# Patient Record
Sex: FEMALE | Race: WHITE | URBAN - METROPOLITAN AREA
[De-identification: names, ages, dates, MRNs, and addresses within clinical notes are randomized per-mention and may not be internally consistent; named-entity substitution may affect disease eponyms.]

---

## 2017-01-28 ENCOUNTER — TELEPHONE (OUTPATIENT)
Dept: FAMILY MEDICINE CLINIC | Facility: CLINIC | Age: 57
End: 2017-01-28

## 2017-01-28 RX ORDER — FLUCONAZOLE 200 MG/1
TABLET ORAL
COMMUNITY
Start: 2016-10-22 | End: 2017-06-17 | Stop reason: ALTCHOICE

## 2017-01-28 RX ORDER — INHALER, ASSIST DEVICES
SPACER (EA) MISCELLANEOUS
COMMUNITY
Start: 2008-11-05 | End: 2020-12-02

## 2017-01-28 RX ORDER — ALBUTEROL SULFATE 2.5 MG/3ML
SOLUTION RESPIRATORY (INHALATION) 2 TIMES DAILY PRN
COMMUNITY
Start: 2008-11-05

## 2017-01-28 RX ORDER — MONTELUKAST SODIUM 10 MG/1
10 TABLET ORAL NIGHTLY
COMMUNITY
Start: 2009-06-08

## 2017-01-28 RX ORDER — ALBUTEROL SULFATE 90 UG/1
2 AEROSOL, METERED RESPIRATORY (INHALATION) DAILY PRN
COMMUNITY
Start: 2009-03-16

## 2017-01-28 NOTE — TELEPHONE ENCOUNTER
As above. Patient with Sinus Congestion/Pain. Allergist feels that at some point will need MRI regarding Chronic Sinus Sx. Today Patient with Sinus Sx and requesting Antibiotic. Patient informed there are no openings today at [facility].   Advise evalu

## 2017-03-06 ENCOUNTER — OFFICE VISIT (OUTPATIENT)
Dept: FAMILY MEDICINE CLINIC | Facility: CLINIC | Age: 57
End: 2017-03-06

## 2017-03-06 VITALS
HEART RATE: 101 BPM | RESPIRATION RATE: 20 BRPM | OXYGEN SATURATION: 97 % | SYSTOLIC BLOOD PRESSURE: 116 MMHG | WEIGHT: 193.63 LBS | TEMPERATURE: 99 F | DIASTOLIC BLOOD PRESSURE: 80 MMHG

## 2017-03-06 DIAGNOSIS — J45.41 MODERATE PERSISTENT ASTHMA WITH ACUTE EXACERBATION: ICD-10-CM

## 2017-03-06 DIAGNOSIS — J20.9 BRONCHITIS, ACUTE, WITH BRONCHOSPASM: Primary | ICD-10-CM

## 2017-03-06 PROCEDURE — 99214 OFFICE O/P EST MOD 30 MIN: CPT | Performed by: FAMILY MEDICINE

## 2017-03-06 RX ORDER — AZITHROMYCIN 250 MG/1
TABLET, FILM COATED ORAL
Qty: 6 TABLET | Refills: 0 | Status: SHIPPED | OUTPATIENT
Start: 2017-03-06 | End: 2017-06-17 | Stop reason: ALTCHOICE

## 2017-03-06 RX ORDER — PREDNISONE 20 MG/1
20 TABLET ORAL 2 TIMES DAILY
Qty: 10 TABLET | Refills: 0 | Status: SHIPPED | OUTPATIENT
Start: 2017-03-06 | End: 2017-03-11

## 2017-03-07 NOTE — PROGRESS NOTES
[facility] [place]  PROGRESS NOTE  Chief Complaint:   Patient presents with:  Cough    HPI:   This is a 56year old female presents complaining of cough for past 2 weeks. Patient is recovering from flu about 2 weeks ago.   Since then she has bee Aerosol Powder, Breath Activated  Disp:  Rfl:    Montelukast Sodium (SINGULAIR) 10 MG Oral Tab Take 10 mg by mouth nightly. Disp:  Rfl:    Progesterone Micronized 100 MG Oral Cap Take 100 mg by mouth daily.    Disp:  Rfl:    Spacer/Aero-Holding Chambers ( No cervical lymphadenopathy, no other lymphadenopathy. SKIN: No rashes, no skin lesion, no bruising, good turgor. LUNGS: No rhonchi or rales present. Frequent congested cough, bilateral wheezing present.   HEART:  Regular rate and rhythm, no murmurs, rub verbalizes understanding. Patient is notified to call with any questions, complications, allergies, or worsening or changing symptoms. Patient is to call with any side effects or complications from the treatments as a result of today.      Problem List:  P

## 2017-03-07 NOTE — PATIENT INSTRUCTIONS
Advice rest, plenty of fluids. Continue with inhalers and nebulizer treatment. Start antibiotics and prednisone. Continue with mucinex as needed   Return to clinic in 1-2 weeks if no improvement. Sooner if symptoms gets worse.

## 2017-06-17 ENCOUNTER — OFFICE VISIT (OUTPATIENT)
Dept: FAMILY MEDICINE CLINIC | Facility: CLINIC | Age: 57
End: 2017-06-17

## 2017-06-17 VITALS
HEART RATE: 84 BPM | DIASTOLIC BLOOD PRESSURE: 70 MMHG | BODY MASS INDEX: 33.63 KG/M2 | RESPIRATION RATE: 14 BRPM | SYSTOLIC BLOOD PRESSURE: 108 MMHG | WEIGHT: 197 LBS | HEIGHT: 64 IN | TEMPERATURE: 99 F

## 2017-06-17 DIAGNOSIS — N89.8 VAGINAL DISCHARGE: ICD-10-CM

## 2017-06-17 DIAGNOSIS — B37.3 VAGINAL CANDIDIASIS: Primary | ICD-10-CM

## 2017-06-17 DIAGNOSIS — N89.8 VAGINAL ITCHING: ICD-10-CM

## 2017-06-17 PROCEDURE — 87510 GARDNER VAG DNA DIR PROBE: CPT | Performed by: NURSE PRACTITIONER

## 2017-06-17 PROCEDURE — 99214 OFFICE O/P EST MOD 30 MIN: CPT | Performed by: NURSE PRACTITIONER

## 2017-06-17 PROCEDURE — 87660 TRICHOMONAS VAGIN DIR PROBE: CPT | Performed by: NURSE PRACTITIONER

## 2017-06-17 PROCEDURE — 87480 CANDIDA DNA DIR PROBE: CPT | Performed by: NURSE PRACTITIONER

## 2017-06-17 RX ORDER — FLUCONAZOLE 150 MG/1
150 TABLET ORAL ONCE
Qty: 1 TABLET | Refills: 0 | Status: SHIPPED | OUTPATIENT
Start: 2017-06-17 | End: 2017-06-17

## 2017-06-17 RX ORDER — METFORMIN HYDROCHLORIDE 500 MG/1
2 TABLET, EXTENDED RELEASE ORAL DAILY
Refills: 5 | COMMUNITY
Start: 2017-06-04

## 2017-06-17 RX ORDER — BUPROPION HYDROCHLORIDE 150 MG/1
150 TABLET ORAL
Refills: 5 | COMMUNITY
Start: 2017-06-08

## 2017-06-17 NOTE — PATIENT INSTRUCTIONS
Take diflucan as prescribed. No alcohol with this medication x 24 hours. Will call with vaginosis swab results next week. Stay hydrated. Keep scheduled appt with Dr. Millard Primrose.

## 2017-06-17 NOTE — PROGRESS NOTES
[address] NOTE  Cristiano Kelly is a 56year old female.     Chief Complaint:  Patient presents with:  Vaginal Problem: vaginal itching/ rashX1 month has been treating with monistat    HPI:   Patient presents to office visit with albuterol sulfate (2.5 MG/3ML) 0.083% Inhalation Nebu Soln  Disp:  Rfl:    Budesonide (RHINOCORT AQUA) 32 MCG/ACT Nasal Suspension  Disp:  Rfl:    Mometasone Furoate (ASMANEX 120 METERED DOSES) 220 MCG/INH Inhalation Aerosol Powder, Breath Activated  Dis Patient Instructions    Meds & Refills for this Visit:    Signed Prescriptions Disp Refills    fluconazole (DIFLUCAN) 150 MG Oral Tab 1 tablet 0      Sig: Take 1 tablet (150 mg total) by mouth once.            Risks, benefits, side effects of medication add

## 2017-06-19 ENCOUNTER — TELEPHONE (OUTPATIENT)
Dept: FAMILY MEDICINE CLINIC | Facility: CLINIC | Age: 57
End: 2017-06-19

## 2017-06-19 RX ORDER — FLUCONAZOLE 150 MG/1
150 TABLET ORAL ONCE
Qty: 1 TABLET | Refills: 0 | Status: SHIPPED | OUTPATIENT
Start: 2017-06-19 | End: 2017-06-19

## 2017-06-19 NOTE — TELEPHONE ENCOUNTER
Patient notified of results states she is still having mild symptoms is wanting to know if she possibly do a longer treatment. Please advise?

## 2017-06-19 NOTE — TELEPHONE ENCOUNTER
----- Message from AMAN Haines sent at 6/19/2017  8:39 AM CDT -----  Negative for yeast, BV, trichomonas. Patient is using Monistat to treat yeast infection at home. Patient was given Diflucan in office visit, please see how patient is doing.

## 2017-06-19 NOTE — TELEPHONE ENCOUNTER
Will give one more dose of diflucan to take tomorrow, order placed. If still having symptoms in 1 week, return for further evaluation. Pt seeing CPX next month.

## 2017-06-19 NOTE — TELEPHONE ENCOUNTER
Patient notified of Clent Roosevelt recommendations notified Diflucan sent patient should take tomorrow and if still having symptoms in 1 week to return to clinic for further evaluation. Patient expressed understanding and thanks.

## 2017-07-29 ENCOUNTER — OFFICE VISIT (OUTPATIENT)
Dept: FAMILY MEDICINE CLINIC | Facility: CLINIC | Age: 57
End: 2017-07-29

## 2017-07-29 VITALS
RESPIRATION RATE: 16 BRPM | SYSTOLIC BLOOD PRESSURE: 118 MMHG | HEIGHT: 64 IN | DIASTOLIC BLOOD PRESSURE: 78 MMHG | HEART RATE: 88 BPM | TEMPERATURE: 98 F | BODY MASS INDEX: 33.32 KG/M2 | WEIGHT: 195.19 LBS | OXYGEN SATURATION: 97 %

## 2017-07-29 DIAGNOSIS — N95.9 MENOPAUSAL AND POSTMENOPAUSAL DISORDER: ICD-10-CM

## 2017-07-29 DIAGNOSIS — Z00.00 ROUTINE GENERAL MEDICAL EXAMINATION AT A HEALTH CARE FACILITY: Primary | ICD-10-CM

## 2017-07-29 DIAGNOSIS — G47.33 OSA (OBSTRUCTIVE SLEEP APNEA): ICD-10-CM

## 2017-07-29 DIAGNOSIS — E66.3 OVERWEIGHT: ICD-10-CM

## 2017-07-29 PROCEDURE — 99396 PREV VISIT EST AGE 40-64: CPT | Performed by: FAMILY MEDICINE

## 2017-07-29 NOTE — PATIENT INSTRUCTIONS
PT TO PROVIDE COPY OF RECORDS OF LABS    PT TO RETURN TO [facility]ONE REPLACEMENT    MAMMOGRAM- - Jessica OREILLY    SEE WORK FORM-  WORKER

## 2017-07-29 NOTE — PROGRESS NOTES
CC: Annual Physical Exam    HPI:   Lu León is a 56year old female who presents for a complete physical exam.    Pt generally feeling well.   COLONOSCOPY DONE 2016  MIRANDA-= 9971  Dr. Dayana Neri- allergist/ endocrine  Pt with postmenopausal dryness, irrita SHARI (obstructive sleep apnea)    • Periodic limb movement disorder (PLMD)    • Premenstrual dysphoric syndrome       Past Surgical History:  No date:       Comment: 2 times  2016: COLONOSCOPY  2009: TOTAL ABDOMINAL HYSTERECTOMY      Comment: OVARI polydipsia. ALLERGIES:  Denies allergic response, history of asthma, sneezing, hives, eczema or rhinitis.     EXAM:   /78 (BP Location: Left arm, Patient Position: Sitting, Cuff Size: large)   Pulse 88   Temp 98.2 °F (36.8 °C) (Temporal)   Resp 16 exam.  Pap and pelvic done. Self breast exam explained. Health maintenance, will check : No orders of the defined types were placed in this encounter.       Patient Instructions   PT TO PROVIDE COPY OF RECORDS OF LABS    PT TO RETURN TO [facility]

## 2017-08-01 PROBLEM — G47.61 PERIODIC LIMB MOVEMENT DISORDER (PLMD): Status: ACTIVE | Noted: 2017-03-08

## 2017-11-08 ENCOUNTER — OFFICE VISIT (OUTPATIENT)
Dept: FAMILY MEDICINE CLINIC | Facility: CLINIC | Age: 57
End: 2017-11-08

## 2017-11-08 VITALS
HEART RATE: 96 BPM | SYSTOLIC BLOOD PRESSURE: 128 MMHG | TEMPERATURE: 100 F | WEIGHT: 192.25 LBS | RESPIRATION RATE: 20 BRPM | OXYGEN SATURATION: 97 % | BODY MASS INDEX: 34.06 KG/M2 | HEIGHT: 63 IN | DIASTOLIC BLOOD PRESSURE: 72 MMHG

## 2017-11-08 DIAGNOSIS — J45.21 MILD INTERMITTENT ASTHMA WITH ACUTE EXACERBATION: Primary | ICD-10-CM

## 2017-11-08 DIAGNOSIS — J06.9 VIRAL UPPER RESPIRATORY TRACT INFECTION: ICD-10-CM

## 2017-11-08 PROCEDURE — 99214 OFFICE O/P EST MOD 30 MIN: CPT | Performed by: FAMILY MEDICINE

## 2017-11-08 RX ORDER — PREDNISONE 20 MG/1
20 TABLET ORAL 2 TIMES DAILY
Qty: 10 TABLET | Refills: 0 | Status: SHIPPED | OUTPATIENT
Start: 2017-11-08 | End: 2017-11-13

## 2017-11-08 RX ORDER — AZITHROMYCIN 250 MG/1
TABLET, FILM COATED ORAL
Qty: 6 TABLET | Refills: 0 | Status: SHIPPED | OUTPATIENT
Start: 2017-11-08 | End: 2018-03-01 | Stop reason: ALTCHOICE

## 2017-11-08 NOTE — PROGRESS NOTES
CHIEF COMPLAINT:   Patient presents with:  Cough: 1.5 wks  Nasal Congestion      HPI:   Percy Lugo is a 56year old female who presents to clinic today with complaints of 10 days PND, cough- progressing. Clear nasal drainage. Can get sob with cough. capsule by mouth daily.  Disp:  Rfl:       Past Medical History:   Diagnosis Date   • Allergic rhinitis    • Asthma    • Cervical dysplasia, mild    • Colon polyps    • Headache    • SHARI (obstructive sleep apnea)    • Periodic limb movement disorder (PLMD) ASSESSMENT AND PLAN:   Conny Seip is a 56year old female who presents with ear problems symptoms are consistent with    ASSESSMENT:  Mild intermittent asthma with acute exacerbation  (primary encounter diagnosis)  Viral upper respiratory tract infe

## 2018-03-01 ENCOUNTER — OFFICE VISIT (OUTPATIENT)
Dept: FAMILY MEDICINE CLINIC | Facility: CLINIC | Age: 58
End: 2018-03-01

## 2018-03-01 ENCOUNTER — TELEPHONE (OUTPATIENT)
Dept: FAMILY MEDICINE CLINIC | Facility: CLINIC | Age: 58
End: 2018-03-01

## 2018-03-01 ENCOUNTER — HOSPITAL ENCOUNTER (OUTPATIENT)
Dept: GENERAL RADIOLOGY | Age: 58
Discharge: HOME | End: 2018-03-01
Attending: FAMILY MEDICINE
Payer: OTHER MISCELLANEOUS

## 2018-03-01 VITALS
DIASTOLIC BLOOD PRESSURE: 76 MMHG | HEIGHT: 62.25 IN | SYSTOLIC BLOOD PRESSURE: 128 MMHG | WEIGHT: 196.19 LBS | TEMPERATURE: 100 F | HEART RATE: 100 BPM | RESPIRATION RATE: 24 BRPM | BODY MASS INDEX: 35.65 KG/M2

## 2018-03-01 DIAGNOSIS — M25.562 ACUTE PAIN OF LEFT KNEE: ICD-10-CM

## 2018-03-01 DIAGNOSIS — M25.562 ACUTE PAIN OF LEFT KNEE: Primary | ICD-10-CM

## 2018-03-01 PROCEDURE — 73560 X-RAY EXAM OF KNEE 1 OR 2: CPT | Performed by: FAMILY MEDICINE

## 2018-03-01 PROCEDURE — 99214 OFFICE O/P EST MOD 30 MIN: CPT | Performed by: FAMILY MEDICINE

## 2018-03-01 RX ORDER — NAPROXEN 500 MG/1
500 TABLET ORAL 2 TIMES DAILY WITH MEALS
Qty: 40 TABLET | Refills: 2 | Status: SHIPPED | OUTPATIENT
Start: 2018-03-01

## 2018-03-01 NOTE — TELEPHONE ENCOUNTER
----- Message from Jessica Guillen MD sent at 3/1/2018  3:22 PM CST -----  Xray report reviewed     No fracture or other bony abnormality     Small effusion  -  (swelling at site of injury)  Continue with current plan

## 2018-03-01 NOTE — PATIENT INSTRUCTIONS
Check xray     Start antiinflammatory - naprosyn  - take regularly twice a day for 1 -2 weeks, then as needed. Ice 10 - 15 minutes 2 - 3 times perday       Consider PT if pain and movement is limited. Recheck here as needed.

## 2018-03-01 NOTE — PROGRESS NOTES
Chief Complaint:   Patient presents with: Worker's Comp: L knee pain- injury on Tuesday 2/27/18      HPI:   This is a 66year old female coming in for acute illness with pain in the left knee. Patient states that she was at work as a . AQUA) 32 MCG/ACT Nasal Suspension 1 spray by Nasal route daily. Disp:  Rfl:    Mometasone Furoate (ASMANEX 120 METERED DOSES) 220 MCG/INH Inhalation Aerosol Powder, Breath Activated Inhale 2 puffs into the lungs 2 (two) times daily.    Disp:  Rfl:    Cecilio David tibial region. Normal ROM         ASSESSMENT AND PLAN:   1. Acute pain of left knee/ upper leg -     - XR KNEE (1 OR 2 VIEWS), LEFT (CPT=73560);  Future        Meds & Refills for this Visit:  Signed Prescriptions Disp Refills    naproxen 500 MG Oral Tab 40

## 2018-03-13 ENCOUNTER — TELEPHONE (OUTPATIENT)
Dept: FAMILY MEDICINE CLINIC | Facility: CLINIC | Age: 58
End: 2018-03-13

## 2018-03-13 NOTE — TELEPHONE ENCOUNTER
Pt need referral for PT. This is a workman comp but her boss wants to use Liability insurance. I asked where can pt go for PT, she states she is not sure and will get back to us.

## 2018-03-26 ENCOUNTER — TELEPHONE (OUTPATIENT)
Dept: FAMILY MEDICINE CLINIC | Facility: CLINIC | Age: 58
End: 2018-03-26

## 2018-03-26 DIAGNOSIS — M25.562 ACUTE PAIN OF LEFT KNEE: Primary | ICD-10-CM

## 2018-03-27 NOTE — TELEPHONE ENCOUNTER
Chart reviewed     [id number] Pao Hayes for referral regarding knee. I do not recall evaluating shoulder - patient should come in for further review and evaluation of shoulder if pain persisting.

## 2018-03-27 NOTE — TELEPHONE ENCOUNTER
Left detailed message for patient that referral has been placed for left knee PT but to come in for evaluation of shoulder. Faxed referral to [facility].

## 2018-08-07 ENCOUNTER — MED REC SCAN ONLY (OUTPATIENT)
Dept: FAMILY MEDICINE CLINIC | Facility: CLINIC | Age: 58
End: 2018-08-07

## 2020-11-05 ENCOUNTER — TELEPHONE (OUTPATIENT)
Dept: FAMILY MEDICINE CLINIC | Facility: CLINIC | Age: 60
End: 2020-11-05

## 2020-11-05 NOTE — TELEPHONE ENCOUNTER
when was her last pneumonia shot     and when does she need her next one ?    please advise          No future appointments.

## 2020-11-06 NOTE — TELEPHONE ENCOUNTER
Pt with no physical since 2017- appointment advised. Immunizations to be discussed with physical and updated medical status.

## 2020-11-06 NOTE — TELEPHONE ENCOUNTER
Pneumovax given 1/7/2014 by   Kendra Ness. Please advise if due for another Pneumovax?     Miguel Ángel Rowe, 11/06/20, 8:47 AM

## 2020-11-06 NOTE — TELEPHONE ENCOUNTER
Patient informed of below. Appt given. Tomeka Mayers, 11/06/20, 9:15 AM      Future appt:     Your appointments     Date & Time Appointment Department [facility])    Dec 02, 2020  2:00 PM CST Physical - Established with Artis Oswald MD [facility]

## 2020-12-02 ENCOUNTER — OFFICE VISIT (OUTPATIENT)
Dept: FAMILY MEDICINE CLINIC | Facility: CLINIC | Age: 60
End: 2020-12-02
Payer: COMMERCIAL

## 2020-12-02 VITALS
SYSTOLIC BLOOD PRESSURE: 118 MMHG | DIASTOLIC BLOOD PRESSURE: 84 MMHG | RESPIRATION RATE: 20 BRPM | BODY MASS INDEX: 33.66 KG/M2 | TEMPERATURE: 99 F | HEIGHT: 63.25 IN | HEART RATE: 108 BPM | WEIGHT: 192.38 LBS

## 2020-12-02 DIAGNOSIS — Z00.00 ANNUAL PHYSICAL EXAM: Primary | ICD-10-CM

## 2020-12-02 DIAGNOSIS — K57.30 DIVERTICULOSIS OF COLON: ICD-10-CM

## 2020-12-02 DIAGNOSIS — U07.1 COVID-19: ICD-10-CM

## 2020-12-02 DIAGNOSIS — J45.20 MILD INTERMITTENT ASTHMA WITHOUT COMPLICATION: ICD-10-CM

## 2020-12-02 DIAGNOSIS — Z86.010 HISTORY OF COLONIC POLYPS: ICD-10-CM

## 2020-12-02 DIAGNOSIS — G47.33 OSA (OBSTRUCTIVE SLEEP APNEA): ICD-10-CM

## 2020-12-02 DIAGNOSIS — J30.9 ALLERGIC RHINITIS, UNSPECIFIED SEASONALITY, UNSPECIFIED TRIGGER: ICD-10-CM

## 2020-12-02 PROCEDURE — 3079F DIAST BP 80-89 MM HG: CPT | Performed by: FAMILY MEDICINE

## 2020-12-02 PROCEDURE — 99396 PREV VISIT EST AGE 40-64: CPT | Performed by: FAMILY MEDICINE

## 2020-12-02 PROCEDURE — 3008F BODY MASS INDEX DOCD: CPT | Performed by: FAMILY MEDICINE

## 2020-12-02 PROCEDURE — 3074F SYST BP LT 130 MM HG: CPT | Performed by: FAMILY MEDICINE

## 2020-12-02 RX ORDER — BECLOMETHASONE DIPROPIONATE HFA 80 UG/1
2 AEROSOL, METERED RESPIRATORY (INHALATION) 2 TIMES DAILY
COMMUNITY
Start: 2020-11-14

## 2020-12-02 NOTE — PATIENT INSTRUCTIONS
rec mammogram    rec colonoscopy- f.u colon polyps- DUE 2019    rec flu vaccine- Oct     rec fasting labs-- pt to provide prior report    Continue care with allergist    Consider prevnar vaccine- pt to check insurance coverage    Encourage healthy d

## 2020-12-02 NOTE — PROGRESS NOTES
CC: Annual Physical Exam    HPI:   Sherine Logan is a 64year old female who presents for a complete physical exam.  Patient last seen 3 years ago    Working at [employer]- presently unemployed.  Had covid October- fever 9 days- now recovered    Pt seeing - with meals.  (Patient not taking: Reported on 12/2/2020 ) 40 tablet 2        Latex                       Comment:Other reaction(s): rash, hives  /rm   Past Medical History:   Diagnosis Date   • Allergic rhinitis    • Asthma    • Cervical dysplasia, mild blood in stool. MUSCULOSKELETAL:  Denies weakness, muscle aches, back pain, joint pain, swelling or stiffness.   NEUROLOGICAL:  Denies headache, seizures, dizziness, syncope, paralysis, ataxia, numbness or tingling in the extremities,change in bowel or dona normal.  BACK: No tenderness, no spasm, SLR test negative, FROM. : Deferred   EXTREMITIES:  No edema, no cyanosis, no clubbing, FROM, 2+ dorsalis pedis pulses bilaterally.   NEURO:  No deficit, normal gait, strength and tone, sensory intact, normal refle exercise                         Discussed diet and exercise. Pt' s weight is Body mass index is 33.81 kg/m². , recommended low fat diet and aerobic exercise 30 minutes three times weekly.   The patient indicates understanding of these issues and agrees t

## 2020-12-03 PROBLEM — M25.562 ACUTE PAIN OF LEFT KNEE: Status: RESOLVED | Noted: 2018-03-01 | Resolved: 2020-12-03

## 2020-12-22 ENCOUNTER — TELEPHONE (OUTPATIENT)
Dept: FAMILY MEDICINE CLINIC | Facility: CLINIC | Age: 60
End: 2020-12-22

## 2020-12-22 NOTE — TELEPHONE ENCOUNTER
----- Message from Leatha Arenas MD sent at 12/21/2020  7:51 PM CST -----  Lab results reviewed  Cbc-normal  Thyroid-normal  hgba1c 5.9  Chemistry--normal  Lipids-normal    Labs consisitent with prediabetes, encourage low carb diet and recheck 6-12 mo

## 2020-12-30 NOTE — TELEPHONE ENCOUNTER
Pt called back, Recommendations reviewed. .  Pt was drawn on 6/23/20 and states she didn't get report to [facility] until now.

## 2023-04-18 ENCOUNTER — OFFICE VISIT (OUTPATIENT)
Dept: FAMILY MEDICINE CLINIC | Facility: CLINIC | Age: 63
End: 2023-04-18
Payer: COMMERCIAL

## 2023-04-18 VITALS
WEIGHT: 186 LBS | OXYGEN SATURATION: 96 % | BODY MASS INDEX: 32.55 KG/M2 | HEART RATE: 104 BPM | TEMPERATURE: 99 F | DIASTOLIC BLOOD PRESSURE: 78 MMHG | HEIGHT: 63.25 IN | RESPIRATION RATE: 18 BRPM | SYSTOLIC BLOOD PRESSURE: 122 MMHG

## 2023-04-18 DIAGNOSIS — Z86.010 HISTORY OF COLONIC POLYPS: ICD-10-CM

## 2023-04-18 DIAGNOSIS — K57.30 DIVERTICULOSIS OF COLON: ICD-10-CM

## 2023-04-18 DIAGNOSIS — R73.03 PREDIABETES: ICD-10-CM

## 2023-04-18 DIAGNOSIS — Z00.00 ANNUAL PHYSICAL EXAM: Primary | ICD-10-CM

## 2023-04-18 DIAGNOSIS — J45.20 MILD INTERMITTENT ASTHMA WITHOUT COMPLICATION: ICD-10-CM

## 2023-04-18 DIAGNOSIS — J30.9 ALLERGIC RHINITIS, UNSPECIFIED SEASONALITY, UNSPECIFIED TRIGGER: ICD-10-CM

## 2023-04-18 DIAGNOSIS — R94.31 ABNORMAL EKG: ICD-10-CM

## 2023-04-18 PROBLEM — U07.1 COVID-19: Status: RESOLVED | Noted: 2020-10-01 | Resolved: 2023-04-18

## 2023-04-18 PROCEDURE — 3008F BODY MASS INDEX DOCD: CPT | Performed by: FAMILY MEDICINE

## 2023-04-18 PROCEDURE — 93000 ELECTROCARDIOGRAM COMPLETE: CPT | Performed by: FAMILY MEDICINE

## 2023-04-18 PROCEDURE — 99396 PREV VISIT EST AGE 40-64: CPT | Performed by: FAMILY MEDICINE

## 2023-04-18 PROCEDURE — 90715 TDAP VACCINE 7 YRS/> IM: CPT | Performed by: FAMILY MEDICINE

## 2023-04-18 PROCEDURE — 99213 OFFICE O/P EST LOW 20 MIN: CPT | Performed by: FAMILY MEDICINE

## 2023-04-18 PROCEDURE — 3074F SYST BP LT 130 MM HG: CPT | Performed by: FAMILY MEDICINE

## 2023-04-18 PROCEDURE — 3078F DIAST BP <80 MM HG: CPT | Performed by: FAMILY MEDICINE

## 2023-04-18 PROCEDURE — 90471 IMMUNIZATION ADMIN: CPT | Performed by: FAMILY MEDICINE

## 2023-04-18 PROCEDURE — 90677 PCV20 VACCINE IM: CPT | Performed by: FAMILY MEDICINE

## 2023-04-18 PROCEDURE — 90472 IMMUNIZATION ADMIN EACH ADD: CPT | Performed by: FAMILY MEDICINE

## 2023-04-18 NOTE — PATIENT INSTRUCTIONS
Rec mammogram-- June    Rec vaccines:  Pneumonia-- today    Shingles- check insurance    Tetnus- today    Recommend fasting labs    Recommend cardiac stress test

## 2023-04-18 NOTE — PROGRESS NOTES
Pt tolerated Tdap and Prevnar 20 well. No reactions reported by pt. No reactions noted at injection sites, right and left deltoid. Pt left without incident.

## 2023-04-20 ENCOUNTER — TELEPHONE (OUTPATIENT)
Dept: FAMILY MEDICINE CLINIC | Facility: CLINIC | Age: 63
End: 2023-04-20

## 2023-04-20 NOTE — TELEPHONE ENCOUNTER
Patient asking if she needs to schedule her echo with Mal Bills or Jillian Mahajan; states that when she called [employer] scheduling they asked why she didn't call [employer] back [phone]

## 2023-04-20 NOTE — TELEPHONE ENCOUNTER
Echo ordered 4/18/2023 to be done at [facility]. Patient informed. Contact information for patient scheduling - [phone] - given to patient to schedule an appt.

## 2023-05-22 ENCOUNTER — TELEPHONE (OUTPATIENT)
Dept: FAMILY MEDICINE CLINIC | Facility: CLINIC | Age: 63
End: 2023-05-22

## 2023-05-22 NOTE — TELEPHONE ENCOUNTER
Dr. Shelley Che's patient-please notify patient that her stress echocardiogram is within normal limits

## (undated) NOTE — LETTER
ASTHMA ACTION PLAN for Haydee Michael     : 1/3/1960     Date: 2020  Provider:  Teri Funez MD  Phone for doctor or clinic: [address] [facility], [address] [place] [id number] [phone] you

## (undated) NOTE — MR AVS SNAPSHOT
[address]  [address] [id number] [phone]               Thank you for choosing us for your health care visit with AMAN Jim.   We are glad to serve you and happy to provide you with this summary of yo Progesterone Micronized 100 MG Caps   Take 100 mg by mouth daily.    Commonly known as:  PROMETRIUM           RHINOCORT AQUA 32 MCG/ACT Susp   Generic drug:  Budesonide           SINGULAIR 10 MG Tabs   Generic drug:  Montelukast Sodium   Take 10 mg by mout

## (undated) NOTE — MR AVS SNAPSHOT
[address] [place] [address],  [address]  [phone]               Thank you for choosing us for your health care visit with Osiris Olivares MD.  We are glad to serve you and happy to provide you with this summary of yo Progesterone Micronized 100 MG Caps   Take 100 mg by mouth daily.    Commonly known as:  PROMETRIUM           RHINOCORT AQUA 32 MCG/ACT Susp   Generic drug:  Budesonide           SINGULAIR 10 MG Tabs   Generic drug:  Montelukast Sodium   Take 10 mg by mout Fully enjoy your food when eating. Don’t eat while distracted and slow down. Avoid over sized portions. Don’t eat while when you’re bored.      EAT THESE FOODS MORE OFTEN: EAT THESE FOODS LESS OFTEN:   Make half your plate fruits and vegetables Highly